# Patient Record
Sex: MALE | Race: OTHER | NOT HISPANIC OR LATINO | Employment: OTHER | URBAN - METROPOLITAN AREA
[De-identification: names, ages, dates, MRNs, and addresses within clinical notes are randomized per-mention and may not be internally consistent; named-entity substitution may affect disease eponyms.]

---

## 2020-06-27 ENCOUNTER — HOSPITAL ENCOUNTER (EMERGENCY)
Facility: HOSPITAL | Age: 60
Discharge: HOME | End: 2020-06-27
Attending: EMERGENCY MEDICINE
Payer: COMMERCIAL

## 2020-06-27 VITALS
RESPIRATION RATE: 17 BRPM | WEIGHT: 180 LBS | SYSTOLIC BLOOD PRESSURE: 134 MMHG | HEIGHT: 63 IN | TEMPERATURE: 98 F | HEART RATE: 72 BPM | OXYGEN SATURATION: 98 % | BODY MASS INDEX: 31.89 KG/M2 | DIASTOLIC BLOOD PRESSURE: 75 MMHG

## 2020-06-27 DIAGNOSIS — K40.90 LEFT INGUINAL HERNIA: Primary | ICD-10-CM

## 2020-06-27 PROCEDURE — 99281 EMR DPT VST MAYX REQ PHY/QHP: CPT

## 2020-06-27 NOTE — ED PROVIDER NOTES
Encounter Date: 6/27/2020    SCRIBE #1 NOTE: I, Martín Nazario, am scribing for, and in the presence of,  Dewayne Castro MD. I have scribed the following portions of the note - Other sections scribed: FRANCOISE, DASH.       History     Chief Complaint   Patient presents with    Abdominal Pain     pt. reports intermitting LLQ abd pain for the past few hours however he reports he has had this pain in the past. denies any n/v/d/ or constipation. denies urinatary problems.      Nikole Jacobsen is a 60 y.o. male who presents to the ED for evaluation of intermittent LLQ to left inguinal region pain. He states having this pain several times recently which would last for a minute and then subside, although the pain this morning was much more severe. Denies nausea, vomiting, diarrhea. He last had a bowel movement about 4 hours ago. He denies headache, eye problems, sore throat, ear pain, cough, shortness of breath. No other abdominal pain. He smokes cigarettes. Denies EtOH use and street drug use.     The history is provided by the patient.     Review of patient's allergies indicates:  No Known Allergies  History reviewed. No pertinent past medical history.  No past surgical history on file.  History reviewed. No pertinent family history.  Social History     Tobacco Use    Smoking status: Not on file   Substance Use Topics    Alcohol use: Not on file    Drug use: Not on file     Review of Systems   Constitutional: Negative for chills, diaphoresis and fever.   HENT: Negative for ear pain and sore throat.    Eyes:        Negative for eye problems.   Respiratory: Negative for cough and shortness of breath.    Cardiovascular: Negative for chest pain.   Gastrointestinal: Positive for abdominal pain (LLQ). Negative for diarrhea and vomiting.   Genitourinary: Negative for dysuria.        Left inguinal pain   Musculoskeletal:        Negative for arm and leg problems.   Skin: Negative for rash.   Neurological: Negative for headaches.        Physical Exam     Initial Vitals [06/27/20 1334]   BP Pulse Resp Temp SpO2   (!) 143/89 76 18 98.1 °F (36.7 °C) 97 %      MAP       --         Physical Exam    ED Course   Procedures  Labs Reviewed - No data to display       Imaging Results    None                     Scribe Attestation:   Scribe #1: I performed the above scribed service and the documentation accurately describes the services I performed. I attest to the accuracy of the note.                          Clinical Impression:       ICD-10-CM ICD-9-CM   1. Left inguinal hernia  K40.90 550.90             ED Disposition Condition    Discharge Stable        ED Prescriptions     None        Follow-up Information     Follow up With Specialties Details Why Contact Info    Kenan Colorado MD General Surgery, Surgery In 3 days Call Monday morning for an appointment [address]  [address] [place], [place] [id number] [phone]                                   I, ***, personally performed the services described in this documentation. All medical record entries made by the scribe were at my direction and in my presence.  I have reviewed the chart and agree that the record reflects my personal performance and is accurate and complete.

## 2020-06-27 NOTE — DISCHARGE INSTRUCTIONS
You have a left inguinal hernia.  You can read about online.  Acute your is to repair it surgically.  The physician above is expert in this.  If your hernia comes out, please lay down and push it back in. If you cannot do this, please return to the emergency room.

## 2020-06-27 NOTE — ED PROVIDER NOTES
Encounter Date: 6/27/2020       History     Chief Complaint   Patient presents with    Abdominal Pain     pt. reports intermitting LLQ abd pain for the past few hours however he reports he has had this pain in the past. denies any n/v/d/ or constipation. denies urinatary problems.      HPI   This 52-year-old male presents to the emergency room complaining of left lower quadrant abdominal pain and a lump that appears in the left inguinal region from time to time especially with coughing P and straining.  He can push the lump back in. The just comes out again.  There is no history of nausea vomiting diarrhea painful urination or other problems.  Review of patient's allergies indicates:  No Known Allergies  History reviewed. No pertinent past medical history.  No past surgical history on file.  History reviewed. No pertinent family history.  Social History     Tobacco Use    Smoking status: Not on file   Substance Use Topics    Alcohol use: Not on file    Drug use: Not on file     Review of Systems  The patient was questioned specifically with regard to the following.  General: Fever, chills, sweats. Neuro: Headache. Eyes: eye problems. ENT: Ear pain, sore throat. Cardiovascular: Chest pain. Respiratory: Cough, shortness of breath. Gastrointestinal: Abdominal pain, vomiting, diarrhea. Genitourinary: Painful urination.  Musculoskeletal: Arm and leg problems. Skin: Rash.  The review of systems was negative except for the following:  Left lower quadrant abdominal pain  Physical Exam     Initial Vitals [06/27/20 1334]   BP Pulse Resp Temp SpO2   (!) 143/89 76 18 98.1 °F (36.7 °C) 97 %      MAP       --         Physical Exam  The patient was examined specifically for the following:   General:No significant distress, Good color, Warm and dry. Head and neck:Scalp atraumatic, Neck supple. Neurological:Appropriate conversation, Gross motor deficits. Eyes:Conjugate gaze, Clear corneas. ENT: No epistaxis. Cardiac: Regular  rate and rhythm, Grossly normal heart tones. Pulmonary: Wheezing, Rales. Gastrointestinal: Abdominal tenderness, Abdominal distention. Musculoskeletal: Extremity deformity, Apparent pain with range of motion of the joints. Skin: Rash.   The findings on examination were normal except for the following:  This patient has a left inguinal hernia that is easily reducible during the physical exam.  He has minimal and expiratory wheezing.  ED Course   Procedures  Labs Reviewed - No data to display       Imaging Results    None       Medical decision making:  Given the above this patient has left inguinal hernia.  It is easily reducible.  I will discharge him and refer him to General surgery for inguinal hernia repair.                                      Clinical Impression:       ICD-10-CM ICD-9-CM   1. Left inguinal hernia  K40.90 550.90             ED Disposition Condition    Discharge Stable        ED Prescriptions     None        Follow-up Information     Follow up With Specialties Details Why Contact Info    Kenan Colorado MD General Surgery, Surgery In 3 days Call Monday morning for an appointment [address]  [address] [place] [phone]  [phone]                                       Dewayne Castro MD  06/27/20 1945

## 2025-03-04 ENCOUNTER — HOSPITAL ENCOUNTER (EMERGENCY)
Facility: HOSPITAL | Age: 65
Discharge: HOME | End: 2025-03-04
Attending: EMERGENCY MEDICINE
Payer: COMMERCIAL

## 2025-03-04 VITALS
OXYGEN SATURATION: 95 % | RESPIRATION RATE: 20 BRPM | SYSTOLIC BLOOD PRESSURE: 139 MMHG | BODY MASS INDEX: 32.2 KG/M2 | HEART RATE: 91 BPM | HEIGHT: 62 IN | DIASTOLIC BLOOD PRESSURE: 78 MMHG | TEMPERATURE: 98 F | WEIGHT: 175 LBS

## 2025-03-04 DIAGNOSIS — R73.9 ELEVATED BLOOD SUGAR: ICD-10-CM

## 2025-03-04 DIAGNOSIS — N20.0 KIDNEY STONE: Primary | ICD-10-CM

## 2025-03-04 LAB
ALBUMIN SERPL-MCNC: 4.2 G/DL (ref 3.3–5.5)
ALP SERPL-CCNC: 111 U/L (ref 42–141)
BILIRUB SERPL-MCNC: 0.6 MG/DL (ref 0.2–1.6)
BILIRUBIN, POC UA: NEGATIVE
BLOOD, POC UA: ABNORMAL
BUN SERPL-MCNC: 19 MG/DL (ref 7–22)
CALCIUM SERPL-MCNC: 9.9 MG/DL (ref 8–10.3)
CHLORIDE SERPL-SCNC: 104 MMOL/L (ref 98–108)
CLARITY, UA: CLEAR
COLOR, UA: YELLOW
CREAT SERPL-MCNC: 0.7 MG/DL (ref 0.6–1.2)
GLUCOSE SERPL-MCNC: 204 MG/DL (ref 73–118)
GLUCOSE, POC UA: ABNORMAL
HCT, POC: NORMAL
HGB, POC: NORMAL (ref 14–18)
KETONES, POC UA: NEGATIVE
LEUKOCYTE EST, POC UA: NEGATIVE
MCH, POC: NORMAL
MCHC, POC: NORMAL
MCV, POC: NORMAL
MPV, POC: NORMAL
NITRITE, POC UA: NEGATIVE
PH UR STRIP: 5.5 [PH] (ref 5–8)
POC ALT (SGPT): 31 U/L (ref 10–47)
POC AST (SGOT): 29 U/L (ref 11–38)
POC PLATELET COUNT: NORMAL
POC TCO2: 27 MMOL/L (ref 18–33)
POTASSIUM BLD-SCNC: 4.4 MMOL/L (ref 3.6–5.1)
PROTEIN, POC UA: ABNORMAL
PROTEIN, POC: 8.3 G/DL (ref 6.4–8.1)
RBC, POC: NORMAL
RDW, POC: NORMAL
SODIUM BLD-SCNC: 136 MMOL/L (ref 128–145)
SPECIFIC GRAVITY, POC UA: 1.02 (ref 1–1.03)
UROBILINOGEN, POC UA: 0.2 E.U./DL
WBC, POC: NORMAL

## 2025-03-04 PROCEDURE — 99285 EMERGENCY DEPT VISIT HI MDM: CPT | Mod: 25,ER

## 2025-03-04 PROCEDURE — 85025 COMPLETE CBC W/AUTO DIFF WBC: CPT | Mod: ER

## 2025-03-04 PROCEDURE — 80053 COMPREHEN METABOLIC PANEL: CPT | Mod: ER

## 2025-03-04 PROCEDURE — 96374 THER/PROPH/DIAG INJ IV PUSH: CPT | Mod: ER

## 2025-03-04 PROCEDURE — 96375 TX/PRO/DX INJ NEW DRUG ADDON: CPT | Mod: ER

## 2025-03-04 PROCEDURE — 25000003 PHARM REV CODE 250: Mod: ER | Performed by: EMERGENCY MEDICINE

## 2025-03-04 PROCEDURE — 63600175 PHARM REV CODE 636 W HCPCS: Mod: JZ,TB,ER | Performed by: EMERGENCY MEDICINE

## 2025-03-04 RX ORDER — ONDANSETRON HYDROCHLORIDE 2 MG/ML
4 INJECTION, SOLUTION INTRAVENOUS
Status: COMPLETED | OUTPATIENT
Start: 2025-03-04 | End: 2025-03-04

## 2025-03-04 RX ORDER — NAPROXEN 500 MG/1
500 TABLET ORAL 2 TIMES DAILY WITH MEALS
Qty: 20 TABLET | Refills: 0 | Status: SHIPPED | OUTPATIENT
Start: 2025-03-04

## 2025-03-04 RX ORDER — KETOROLAC TROMETHAMINE 30 MG/ML
15 INJECTION, SOLUTION INTRAMUSCULAR; INTRAVENOUS
Status: COMPLETED | OUTPATIENT
Start: 2025-03-04 | End: 2025-03-04

## 2025-03-04 RX ADMIN — SODIUM CHLORIDE 1000 ML: 9 INJECTION, SOLUTION INTRAVENOUS at 08:03

## 2025-03-04 RX ADMIN — ONDANSETRON 4 MG: 2 INJECTION INTRAMUSCULAR; INTRAVENOUS at 08:03

## 2025-03-04 RX ADMIN — KETOROLAC TROMETHAMINE 15 MG: 30 INJECTION, SOLUTION INTRAMUSCULAR at 08:03

## 2025-03-04 NOTE — ED PROVIDER NOTES
Encounter Date: 3/4/2025    SCRIBE #1 NOTE: I, Sureshkarson Soto, am scribing for, and in the presence of,  Jose Pathak MD. I have scribed the following portions of the note - Other sections scribed: HPI, ROS, PE.   SCRIBE #2 NOTE: IByron , am scribing for, and in the presence of,  Jose Pathak MD. I have scribed the following portions of the note - Other sections scribed: HPI, ROS, PE.     History     Chief Complaint   Patient presents with    Abdominal Pain     Lower left sided abdominal pain starting this am at 0500.       Nikole Jacobsen is a 64 y.o. male, with a past medical history of nephrolithiasis, who presents to the ED with left flank pain starting this morning. Patient reports left lower flank pain that radiates around to his left groin. Patient states that he had similar pain 2 years ago with a previous kidney stone. Patient reports his LBM was this morning. No other exacerbating or alleviating factors. Patient denies emesis, fever, or other associated symptoms. Patient has no other complaints at the present time.     The history is provided by the patient. No  was used.     Review of patient's allergies indicates:  No Known Allergies  History reviewed. No pertinent past medical history.  History reviewed. No pertinent surgical history.  No family history on file.  Social History[1]  Review of Systems   Constitutional: Negative.  Negative for fever.   HENT: Negative.     Eyes: Negative.    Respiratory: Negative.     Cardiovascular: Negative.    Gastrointestinal:  Positive for nausea. Negative for vomiting.   Endocrine: Negative.    Genitourinary:  Positive for flank pain.   Skin: Negative.    Allergic/Immunologic: Negative.    Neurological: Negative.    Hematological: Negative.    Psychiatric/Behavioral: Negative.         Physical Exam     Initial Vitals [03/04/25 0742]   BP Pulse Resp Temp SpO2   (!) 171/93 101 20 98 °F (36.7 °C) 95 %      MAP       --          Physical Exam    Constitutional: Vital signs are normal. He appears well-developed. He is active and cooperative.   HENT:   Head: Normocephalic and atraumatic.   Eyes: Conjunctivae, EOM and lids are normal. Pupils are equal, round, and reactive to light.   Neck: Trachea normal. Neck supple. No thyroid mass present.    Full passive range of motion without pain.     Cardiovascular:  Normal rate, regular rhythm, S1 normal, S2 normal, normal heart sounds, intact distal pulses and normal pulses.     Exam reveals no gallop and no friction rub.       No murmur heard.  Pulmonary/Chest: Breath sounds normal. No respiratory distress.   Abdominal: Abdomen is soft. Bowel sounds are normal. There is no abdominal tenderness.   Left flank tenderness that radiates to the left groin consistent with patient's prior history of kidney stones.  There is no rebound and no guarding.   Genitourinary:    Genitourinary Comments:  exam chaperoned by Gianfranco Nina RN. Bilateral laser repair. Intact inguinal ring.      Musculoskeletal:         General: Normal range of motion.      Cervical back: Full passive range of motion without pain and neck supple.     Lymphadenopathy:     He has no axillary adenopathy.   Neurological: He is alert and oriented to person, place, and time.   Skin: Skin is warm, dry and intact.   Psychiatric: He has a normal mood and affect. His speech is normal and behavior is normal. Judgment and thought content normal. Cognition and memory are normal.         ED Course   Procedures  Labs Reviewed   POCT URINALYSIS W/O SCOPE - Abnormal       Result Value    Glucose, UA Trace (*)     Bilirubin, UA Negative      Ketones, UA Negative      Spec Grav UA 1.025      Blood, UA 1+ (*)     PH, UA 5.5      Protein, UA 1+ (*)     Urobilinogen, UA 0.2      Nitrite, UA Negative      Leukocytes, UA Negative      Color, UA POC Yellow      Clarity, UA, POC Clear     POCT CMP - Abnormal    Albumin, POC 4.2      Alkaline Phosphatase,        ALT (SGPT), POC 31      AST (SGOT), POC 29      POC BUN 19      Calcium, POC 9.9      POC Chloride 104      POC Creatinine 0.7      POC Glucose 204 (*)     POC Potassium 4.4      POC Sodium 136      Bilirubin, POC 0.6      POC TCO2 27      Protein, POC 8.3 (*)    POCT CBC    Hematocrit        Hemoglobin        RBC        WBC        MCV        MCH, POC        MCHC        RDW-CV        Platelet Count, POC        MPV       POCT URINALYSIS(INSTRUMENT)   POCT CMP          Imaging Results              CT Renal Stone Study ABD Pelvis WO (Final result)  Result time 03/04/25 08:56:30      Final result by Javon Ruth MD (03/04/25 08:56:30)                   Impression:      1. On the left, there is mild asymmetric enlargement of the kidney with surrounding inflammatory changes. Mild hydroureter is suggested.  No definite radiodense calculi within the left kidney or ureter.  Punctate calculus suggested dependently within the right aspect of the urinary bladder, possibly counting for the above.  Ascending urinary tract infection not excluded may account the above as well.  Correlation advised.  2. Additional details, as provided in the body of the report.      Electronically signed by: Javon Ruth  Date:    03/04/2025  Time:    08:56               Narrative:    EXAMINATION:  CT RENAL STONE STUDY ABD PELVIS WO    CLINICAL HISTORY:  Flank pain, kidney stone suspected;    TECHNIQUE:  Low dose axial images, sagittal and coronal reformations were obtained from the lung bases to the pubic symphysis.  Contrast was not administered.    COMPARISON:  None    FINDINGS:  Evaluation of the solid organs is limited due to lack of intravenous contrast.    Lower chest: Unremarkable.    URINARY COLLECTING SYSTEM:    On the right, no evidence of radiopaque urolithiasis or obstructive uropathy.    On the left, there is mild asymmetric enlargement of the kidney with surrounding inflammatory changes.  Mild hydroureter is  suggested.  No definite radiodense calculi within the left kidney or ureter.  Punctate calculus suggested dependently within the right aspect of the urinary bladder.    Liver: Normal contour.  Findings in keeping with hepatic steatosis.    Gallbladder and bile ducts: Unremarkable.    Pancreas: Normal contour.    Spleen: Normal contour.    Adrenals: Normal contour.    Lymph nodes: No abdominal or pelvic lymphadenopathy.    Bowel and mesentery: No evidence of bowel obstruction.  Colonic diverticulosis.  Normal appendix.    Abdominal aorta: Nonaneurysmal.  Mild-to-moderate atherosclerotic calcification.    Inferior vena cava: Unremarkable.    Free fluid or free air: None.    Pelvis: Unremarkable.    Body wall: Bilateral fat containing inguinal hernias.    Bones: No definite acute findings.  Relatively modest degenerative change of the spine.  Nonaggressive appearing tiny sclerotic lesion in the proximal left femur, possibly island.                                       Medications   sodium chloride 0.9% bolus 1,000 mL 1,000 mL (1,000 mLs Intravenous New Bag 3/4/25 0813)   ondansetron injection 4 mg (4 mg Intravenous Given 3/4/25 0812)   ketorolac injection 15 mg (15 mg Intravenous Given 3/4/25 0812)     Medical Decision Making  Patient with the parent recently passed stone mild hydroureter.  No evidence to suggest any urinary tract infection in his urinalysis is unremarkable with the exception of 1+ blood.  That is some mild hydroureter again associated with recently passed stone.    Patient also with a blood sugar of 204 had discussion with him he states he had a large meal prior to coming here today.  I asked The patient to monitor his blood sugar has no signs of DKA or no signs of new onset diabetes at this point.    Amount and/or Complexity of Data Reviewed  Labs: ordered. Decision-making details documented in ED Course.  Radiology: ordered. Decision-making details documented in ED Course.    Risk  Prescription  drug management.            Scribe Attestation:   Scribe #1: I performed the above scribed service and the documentation accurately describes the services I performed. I attest to the accuracy of the note.  Scribe #2: I performed the above scribed service and the documentation accurately describes the services I performed. I attest to the accuracy of the note.        ED Course as of 03/04/25 0916   Tue Mar 04, 2025   0855 Urinalysis with 1+ blood and 1+ protein [MI]   0906 CBC with a white count of 10.3 otherwise unremarkable [MI]   0907 No evidence of urinary tract infection so no correlation other than likely kidney stone that is passed. [MI]      ED Course User Index  [MI] Jose Pathak MD                       This document was produced by a scribe under my direction and in my presence. I agree with the content of the note and have made any necessary edits.     Jose Pathak MD    03/04/2025 9:09 AM      Clinical Impression:  Final diagnoses:  [N20.0] Kidney stone (Primary)          ED Disposition Condition    Discharge Stable          ED Prescriptions       Medication Sig Dispense Start Date End Date Auth. Provider    naproxen (NAPROSYN) 500 MG tablet Take 1 tablet (500 mg total) by mouth 2 (two) times daily with meals. 20 tablet 3/4/2025 -- Jose Pathak MD          Follow-up Information       Follow up With Specialties Details Why Contact Info    Urology  Schedule an appointment as soon as possible for a visit in 1 week                 Jose Pathak MD  03/04/25 0909         [1]         Jose Pathak MD  03/04/25 0916